# Patient Record
Sex: FEMALE | Race: WHITE | Employment: FULL TIME | RURAL
[De-identification: names, ages, dates, MRNs, and addresses within clinical notes are randomized per-mention and may not be internally consistent; named-entity substitution may affect disease eponyms.]

---

## 2021-12-07 ENCOUNTER — HOSPITAL ENCOUNTER (OUTPATIENT)
Dept: MAMMOGRAPHY | Age: 56
Discharge: HOME | End: 2021-12-07
Attending: OBSTETRICS & GYNECOLOGY
Payer: COMMERCIAL

## 2021-12-07 VITALS — BODY MASS INDEX: 31.09 KG/M2 | WEIGHT: 170 LBS

## 2021-12-07 DIAGNOSIS — Z12.31 VISIT FOR SCREENING MAMMOGRAM: ICD-10-CM

## 2021-12-07 PROCEDURE — 77063 BREAST TOMOSYNTHESIS BI: CPT

## 2022-03-17 ENCOUNTER — TRANSCRIBE ORDER (OUTPATIENT)
Dept: SCHEDULING | Age: 57
End: 2022-03-17

## 2022-03-17 DIAGNOSIS — R22.32 SUBCUTANEOUS MASS OF LEFT THUMB: Primary | ICD-10-CM

## 2022-03-24 ENCOUNTER — HOSPITAL ENCOUNTER (OUTPATIENT)
Dept: MRI IMAGING | Age: 57
Discharge: HOME | End: 2022-03-24
Attending: ORTHOPAEDIC SURGERY
Payer: COMMERCIAL

## 2022-03-24 DIAGNOSIS — R22.32 SUBCUTANEOUS MASS OF LEFT THUMB: ICD-10-CM

## 2022-03-24 PROCEDURE — 73218 MRI UPPER EXTREMITY W/O DYE: CPT

## 2022-04-08 RX ORDER — CHOLECALCIFEROL (VITAMIN D3) 125 MCG
1 CAPSULE ORAL
COMMUNITY

## 2022-04-08 NOTE — PERIOP NOTES
[facility]  Ambulatory Surgery Unit  Pre-operative Instructions    Surgery/Procedure Date  4/21            Tentative Arrival Time TBD      1. On the day of your surgery/procedure, please report to the Ambulatory Surgery Unit Registration Desk and sign in at your designated time. The Ambulatory Surgery Unit is located in [facility] on the [facility] side of the [facility] across from the [address] [place] [address]. Please have all of your health insurance cards and a photo ID.    **TWO adults may accompany you the day of the procedure. We have limited seating available. If our waiting room is at capacity, your ride may be asked to remain in their vehicle. No one under 15 is allowed in the waiting room. Masks, fully covering the mouth and nose, are required in the waiting room. 2. You must have someone with you to drive you home, as you should not drive a car for 24 hours following anesthesia. Please make arrangements for a responsible adult friend or family member to stay with you for at least the first 24 hours after your surgery. 3. Do not have anything to eat or drink (including water, gum, mints, coffee, juice) after 11:59 PM  4/20. This may not apply to medications prescribed by your physician. (Please note below the special instructions with medications to take the morning of surgery, if applicable.)    4. We recommend you do not drink any alcoholic beverages for 24 hours before and after your surgery. 5. Contact your surgeons office for instructions on the following medications: non-steroidal anti-inflammatory drugs (i.e. Advil, Aleve), vitamins, and supplements. (Some surgeons will want you to stop these medications prior to surgery and others may allow you to take them)   **If you are currently taking Plavix, Coumadin, Aspirin and/or other blood-thinning agents, contact your surgeon for instructions. ** Your surgeon will partner with the physician prescribing these medications to determine if it is safe to stop or if you need to continue taking. Please do not stop taking these medications without instructions from your surgeon. 6. In an effort to help prevent surgical site infection, we ask that you shower with an anti-bacterial soap (i.e. Dial/Safeguard, or the soap provided to you at your preadmission testing appointment) for 3 days prior to and on the morning of surgery, using a fresh towel after each shower. (Please begin this process with fresh bed linens.) Do not apply any lotions, powders, or deodorants after the shower on the day of your procedure. If applicable, please do not shave the operative site for 48 hours prior to surgery. 7. Wear comfortable clothes. Wear glasses instead of contacts. Do not bring any jewelry or money (other than copays or fees as instructed). Do not wear make-up, particularly mascara, the morning of your surgery. Do not wear nail polish, particularly if you are having foot /hand surgery. Wear your hair loose or down, no ponytails, buns, whit pins or clips. All body piercings must be removed. 8. You should understand that if you do not follow these instructions your surgery may be cancelled. If your physical condition changes (i.e. fever, cold or flu) please contact your surgeon as soon as possible. 9. It is important that you be on time. If a situation occurs where you may be late, or if you have any questions or problems, please call [phone].    10. Your surgery time may be subject to change. You will receive a phone call the day prior to surgery to confirm your arrival time. Special Instructions: Take all medications and inhalers, as prescribed, on the morning of surgery with a sip of water. I understand a pre-operative phone call will be made to verify my surgery time. In the event that I am not available, I give permission for a message to be left on my answering service and/or with another person? yes    Reviewed by phone with pt, verbalized understanding.      ___________________      ___________________      ________________  (Signature of Patient)          (Witness)                   (Date and Time)

## 2022-04-20 ENCOUNTER — ANESTHESIA EVENT (OUTPATIENT)
Dept: SURGERY | Age: 57
End: 2022-04-20
Payer: COMMERCIAL

## 2022-04-21 ENCOUNTER — ANESTHESIA (OUTPATIENT)
Dept: SURGERY | Age: 57
End: 2022-04-21
Payer: COMMERCIAL

## 2022-04-21 ENCOUNTER — HOSPITAL ENCOUNTER (OUTPATIENT)
Age: 57
Setting detail: OUTPATIENT SURGERY
Discharge: HOME | End: 2022-04-21
Attending: ORTHOPAEDIC SURGERY | Admitting: ORTHOPAEDIC SURGERY
Payer: COMMERCIAL

## 2022-04-21 VITALS
BODY MASS INDEX: 31.83 KG/M2 | DIASTOLIC BLOOD PRESSURE: 93 MMHG | WEIGHT: 173 LBS | HEIGHT: 62 IN | TEMPERATURE: 97.7 F | OXYGEN SATURATION: 100 % | SYSTOLIC BLOOD PRESSURE: 137 MMHG | RESPIRATION RATE: 15 BRPM | HEART RATE: 75 BPM

## 2022-04-21 DIAGNOSIS — G89.18 POSTOPERATIVE PAIN OF EXTREMITY: Primary | ICD-10-CM

## 2022-04-21 DIAGNOSIS — M79.609 POSTOPERATIVE PAIN OF EXTREMITY: Primary | ICD-10-CM

## 2022-04-21 PROCEDURE — 76030000001 HC AMB SURG OR TIME 1 TO 1.5: Performed by: ORTHOPAEDIC SURGERY

## 2022-04-21 PROCEDURE — 74011250636 HC RX REV CODE- 250/636: Performed by: ANESTHESIOLOGY

## 2022-04-21 PROCEDURE — A4565 SLINGS: HCPCS | Performed by: ORTHOPAEDIC SURGERY

## 2022-04-21 PROCEDURE — 76210000040 HC AMBSU PH I REC FIRST 0.5 HR: Performed by: ORTHOPAEDIC SURGERY

## 2022-04-21 PROCEDURE — 77030040356 HC CORD BPLR FRCP COVD -A: Performed by: ORTHOPAEDIC SURGERY

## 2022-04-21 PROCEDURE — 77030003601 HC NDL NRV BLK BBMI -A: Performed by: ANESTHESIOLOGY

## 2022-04-21 PROCEDURE — 77030002916 HC SUT ETHLN J&J -A: Performed by: ORTHOPAEDIC SURGERY

## 2022-04-21 PROCEDURE — 88304 TISSUE EXAM BY PATHOLOGIST: CPT

## 2022-04-21 PROCEDURE — 74011000250 HC RX REV CODE- 250: Performed by: ORTHOPAEDIC SURGERY

## 2022-04-21 PROCEDURE — 77030021352 HC CBL LD SYS DISP COVD -B: Performed by: ORTHOPAEDIC SURGERY

## 2022-04-21 PROCEDURE — 76210000046 HC AMBSU PH II REC FIRST 0.5 HR: Performed by: ORTHOPAEDIC SURGERY

## 2022-04-21 PROCEDURE — 77030000032 HC CUF TRNQT ZIMM -B: Performed by: ORTHOPAEDIC SURGERY

## 2022-04-21 PROCEDURE — 74011000250 HC RX REV CODE- 250: Performed by: NURSE ANESTHETIST, CERTIFIED REGISTERED

## 2022-04-21 PROCEDURE — 74011250636 HC RX REV CODE- 250/636: Performed by: NURSE ANESTHETIST, CERTIFIED REGISTERED

## 2022-04-21 PROCEDURE — 2709999900 HC NON-CHARGEABLE SUPPLY: Performed by: ORTHOPAEDIC SURGERY

## 2022-04-21 PROCEDURE — 74011250636 HC RX REV CODE- 250/636: Performed by: ORTHOPAEDIC SURGERY

## 2022-04-21 PROCEDURE — 76060000062 HC AMB SURG ANES 1 TO 1.5 HR: Performed by: ORTHOPAEDIC SURGERY

## 2022-04-21 RX ORDER — SODIUM CHLORIDE, SODIUM LACTATE, POTASSIUM CHLORIDE, CALCIUM CHLORIDE 600; 310; 30; 20 MG/100ML; MG/100ML; MG/100ML; MG/100ML
25 INJECTION, SOLUTION INTRAVENOUS CONTINUOUS
Status: DISCONTINUED | OUTPATIENT
Start: 2022-04-21 | End: 2022-04-21 | Stop reason: HOSPADM

## 2022-04-21 RX ORDER — MIDAZOLAM HYDROCHLORIDE 1 MG/ML
INJECTION, SOLUTION INTRAMUSCULAR; INTRAVENOUS AS NEEDED
Status: DISCONTINUED | OUTPATIENT
Start: 2022-04-21 | End: 2022-04-21 | Stop reason: HOSPADM

## 2022-04-21 RX ORDER — MORPHINE SULFATE 10 MG/ML
2 INJECTION, SOLUTION INTRAMUSCULAR; INTRAVENOUS
Status: DISCONTINUED | OUTPATIENT
Start: 2022-04-21 | End: 2022-04-21 | Stop reason: HOSPADM

## 2022-04-21 RX ORDER — DIPHENHYDRAMINE HYDROCHLORIDE 50 MG/ML
12.5 INJECTION, SOLUTION INTRAMUSCULAR; INTRAVENOUS AS NEEDED
Status: DISCONTINUED | OUTPATIENT
Start: 2022-04-21 | End: 2022-04-21 | Stop reason: HOSPADM

## 2022-04-21 RX ORDER — FENTANYL CITRATE 50 UG/ML
25 INJECTION, SOLUTION INTRAMUSCULAR; INTRAVENOUS
Status: DISCONTINUED | OUTPATIENT
Start: 2022-04-21 | End: 2022-04-21 | Stop reason: HOSPADM

## 2022-04-21 RX ORDER — LIDOCAINE HYDROCHLORIDE 10 MG/ML
0.1 INJECTION, SOLUTION EPIDURAL; INFILTRATION; INTRACAUDAL; PERINEURAL AS NEEDED
Status: DISCONTINUED | OUTPATIENT
Start: 2022-04-21 | End: 2022-04-21 | Stop reason: HOSPADM

## 2022-04-21 RX ORDER — SODIUM CHLORIDE 0.9 % (FLUSH) 0.9 %
5-40 SYRINGE (ML) INJECTION EVERY 8 HOURS
Status: DISCONTINUED | OUTPATIENT
Start: 2022-04-21 | End: 2022-04-21 | Stop reason: HOSPADM

## 2022-04-21 RX ORDER — SODIUM CHLORIDE 0.9 % (FLUSH) 0.9 %
5-40 SYRINGE (ML) INJECTION AS NEEDED
Status: DISCONTINUED | OUTPATIENT
Start: 2022-04-21 | End: 2022-04-21 | Stop reason: HOSPADM

## 2022-04-21 RX ORDER — PROPOFOL 10 MG/ML
INJECTION, EMULSION INTRAVENOUS
Status: DISCONTINUED | OUTPATIENT
Start: 2022-04-21 | End: 2022-04-21 | Stop reason: HOSPADM

## 2022-04-21 RX ORDER — MIDAZOLAM HYDROCHLORIDE 1 MG/ML
INJECTION, SOLUTION INTRAMUSCULAR; INTRAVENOUS
Status: COMPLETED
Start: 2022-04-21 | End: 2022-04-21

## 2022-04-21 RX ORDER — HYDROMORPHONE HYDROCHLORIDE 1 MG/ML
.2-.5 INJECTION, SOLUTION INTRAMUSCULAR; INTRAVENOUS; SUBCUTANEOUS ONCE
Status: DISCONTINUED | OUTPATIENT
Start: 2022-04-21 | End: 2022-04-21 | Stop reason: HOSPADM

## 2022-04-21 RX ORDER — WATER FOR INJECTION,STERILE
VIAL (ML) INJECTION
Status: DISCONTINUED
Start: 2022-04-21 | End: 2022-04-21 | Stop reason: HOSPADM

## 2022-04-21 RX ORDER — LIDOCAINE HYDROCHLORIDE 20 MG/ML
INJECTION, SOLUTION EPIDURAL; INFILTRATION; INTRACAUDAL; PERINEURAL AS NEEDED
Status: DISCONTINUED | OUTPATIENT
Start: 2022-04-21 | End: 2022-04-21 | Stop reason: HOSPADM

## 2022-04-21 RX ORDER — ONDANSETRON 2 MG/ML
4 INJECTION INTRAMUSCULAR; INTRAVENOUS AS NEEDED
Status: DISCONTINUED | OUTPATIENT
Start: 2022-04-21 | End: 2022-04-21 | Stop reason: HOSPADM

## 2022-04-21 RX ORDER — HYDROCODONE BITARTRATE AND ACETAMINOPHEN 5; 325 MG/1; MG/1
1 TABLET ORAL
Qty: 10 TABLET | Refills: 0 | Status: SHIPPED | OUTPATIENT
Start: 2022-04-21 | End: 2022-04-24

## 2022-04-21 RX ORDER — PROPOFOL 10 MG/ML
INJECTION, EMULSION INTRAVENOUS AS NEEDED
Status: DISCONTINUED | OUTPATIENT
Start: 2022-04-21 | End: 2022-04-21 | Stop reason: HOSPADM

## 2022-04-21 RX ORDER — CEFAZOLIN SODIUM 1 G/3ML
INJECTION, POWDER, FOR SOLUTION INTRAMUSCULAR; INTRAVENOUS
Status: DISCONTINUED
Start: 2022-04-21 | End: 2022-04-21 | Stop reason: HOSPADM

## 2022-04-21 RX ORDER — OXYCODONE AND ACETAMINOPHEN 5; 325 MG/1; MG/1
1 TABLET ORAL
Status: DISCONTINUED | OUTPATIENT
Start: 2022-04-21 | End: 2022-04-21 | Stop reason: HOSPADM

## 2022-04-21 RX ADMIN — WATER 2 G: 1 INJECTION INTRAMUSCULAR; INTRAVENOUS; SUBCUTANEOUS at 08:53

## 2022-04-21 RX ADMIN — PROPOFOL INJECTABLE EMULSION 75 MCG/KG/MIN: 10 INJECTION, EMULSION INTRAVENOUS at 08:55

## 2022-04-21 RX ADMIN — SODIUM CHLORIDE, POTASSIUM CHLORIDE, SODIUM LACTATE AND CALCIUM CHLORIDE 25 ML/HR: 600; 310; 30; 20 INJECTION, SOLUTION INTRAVENOUS at 07:47

## 2022-04-21 RX ADMIN — PROPOFOL 80 MG: 10 INJECTION, EMULSION INTRAVENOUS at 08:51

## 2022-04-21 RX ADMIN — MEPIVACAINE HYDROCHLORIDE 30 ML: 15 INJECTION, SOLUTION EPIDURAL; INFILTRATION at 08:00

## 2022-04-21 RX ADMIN — MIDAZOLAM HYDROCHLORIDE 3 MG: 1 INJECTION, SOLUTION INTRAMUSCULAR; INTRAVENOUS at 07:56

## 2022-04-21 RX ADMIN — LIDOCAINE HYDROCHLORIDE 40 MG: 20 INJECTION, SOLUTION EPIDURAL; INFILTRATION; INTRACAUDAL; PERINEURAL at 08:51

## 2022-04-21 NOTE — ANESTHESIA PROCEDURE NOTES
Peripheral Block    Start time: 4/21/2022 7:53 AM  End time: 4/21/2022 8:05 AM  Performed by: Ming Avitia MD  Authorized by: Ming Avitia MD       Pre-procedure: Indications: at surgeon's request, post-op pain management and primary anesthetic    Preanesthetic Checklist: patient identified, risks and benefits discussed, site marked, timeout performed, anesthesia consent given and patient being monitored    Timeout Time: 07:55 EDT          Block Type:   Block Type:  Axillary  Laterality:  Left  Monitoring:  Standard ASA monitoring, responsive to questions and oxygen  Injection Technique:  Single shot  Procedures: ultrasound guided    Patient Position: supine  Prep: chlorhexidine    Location:  Axilla  Needle Type:  Stimuplex  Needle Gauge:  22 G  Needle Localization:  Ultrasound guidance  Medication Injected:  Mepivacaine PF (CARBOCAINE) 1.5 % injection, 30 mL  Med Admin Time: 4/21/2022 8:00 AM    Assessment:  Number of attempts:  1  Injection Assessment:  Incremental injection every 5 mL, no paresthesia, local visualized surrounding nerve on ultrasound, negative aspiration for blood and no intravascular symptoms  Patient tolerance:  Patient tolerated the procedure well with no immediate complications  Intercostobrachial  Block: left axilla, chlohex prep, 25 g needle & 5 ml 1.5% mepivacaine injected subcut. Tolerated well.

## 2022-04-21 NOTE — BRIEF OP NOTE
Brief Postoperative Note    Patient: Enoch Torrez  YOB: 1965  MRN: [id number]    Date of Procedure: 4/21/2022     Pre-Op Diagnosis: MASS THENAR EMINENCE LEFT (4X3 CM)    Post-Op Diagnosis: Same as preoperative diagnosis. Procedure(s):  EXCISION MASS LEFT THENAR EMINENCE (AX BLOCK)    Surgeon(s):  Carroll Baca MD    Surgical Assistant: None    Anesthesia: Other     Estimated Blood Loss (mL): Minimal    Complications: None    Specimens:   ID Type Source Tests Collected by Time Destination   1 : LEFT HAND MASS Preservative Hand  Carroll Baca MD 4/21/2022 1970 Pathology        Implants: * No implants in log *    Drains: * No LDAs found *    Findings: As above, probable lipoma.     Electronically Signed by Patrick Gar MD on 4/21/2022 at 10:13 AM

## 2022-04-21 NOTE — PERIOP NOTES
Kacyzahida Colmenares  1965  [id number]    Situation:  Verbal report given from: Zane Morales CRNA, Shyam Loja RN  Procedure: Procedure(s):  EXCISION MASS LEFT THENAR EMINENCE (AX BLOCK)    Background:    Preoperative diagnosis: MASS THENAR EMINENCE LEFT (4X3 CM)    Postoperative diagnosis: MASS THENAR EMINENCE LEFT (4X3 CM)    :  Dr. Ale Ferraro    Assistant(s): Circ-1: Jordyn Mccoy RN  Scrub Tech-1: Clark Quintanilla    Specimens:   ID Type Source Tests Collected by Time Destination   1 : LEFT HAND MASS Preservative Hand  Charmayne Martens, MD 4/21/2022 0906 Pathology       Assessment:  Intra-procedure medications         Anesthesia gave intra-procedure sedation and medications, see anesthesia flow sheet     Intravenous fluids: LR@ KVO     Vital signs stable       Recommendation:    Permission to share finding with  : yes

## 2022-04-21 NOTE — PERIOP NOTES
1007-Pt. To pacu, sleeping. Vss. Oral airway in place. Dressing to left hand intact. 1014-Oral airway removed. Pt. Denies pain. 1028-Discharge instructions reviewed w/pts. . He verbalized understanding. 1042-Pt. States ready for discharge. Vss. Denies pain. Dressing to left arm intact. Sling placed to left arm. Pt discharged via wheelchair to car, accompanied by RN. Pt discharged awake and alert, respirations equal and unlabored, skin warm, dry, and intact. Pt and family members' questions and concerns addressed prior to discharge.

## 2022-04-21 NOTE — PERIOP NOTES
Permission received to review discharge instructions and discuss private health information with Violet Ramírez, .

## 2022-04-21 NOTE — PERIOP NOTES
Dr. Poornima Smith performed nerve block in preop using ultrasound machine to LUE. Pt on CM x3, 02 by NC at 3L, patient responsive when spoken to. Able to answer questions appropriately. Pt did receive Versed 3 mg IV given by Dr. Poornima Smith for sedation. Pt tolerated procedure well. VSS and will continue to monitor.

## 2022-04-21 NOTE — ANESTHESIA PREPROCEDURE EVALUATION
Relevant Problems   No relevant active problems       Anesthetic History   No history of anesthetic complications            Review of Systems / Medical History  Patient summary reviewed, nursing notes reviewed and pertinent labs reviewed    Pulmonary  Within defined limits                 Neuro/Psych   Within defined limits           Cardiovascular                  Exercise tolerance: >4 METS  Comments: Elevated BP; observation only   GI/Hepatic/Renal  Within defined limits              Endo/Other        Obesity     Other Findings   Comments: Mass left thenar eminence         Physical Exam    Airway  Mallampati: I  TM Distance: 4 - 6 cm  Neck ROM: normal range of motion   Mouth opening: Normal     Cardiovascular    Rhythm: regular  Rate: normal         Dental    Dentition: Implants     Pulmonary  Breath sounds clear to auscultation               Abdominal  GI exam deferred       Other Findings            Anesthetic Plan    ASA: 1  Anesthesia type: MAC and regional - brachial plexus block      Post-op pain plan if not by surgeon: peripheral nerve block single      Anesthetic plan and risks discussed with: Patient

## 2022-04-21 NOTE — ANESTHESIA POSTPROCEDURE EVALUATION
Procedure(s):  EXCISION MASS LEFT THENAR EMINENCE (AX BLOCK). MAC, regional    Anesthesia Post Evaluation      Multimodal analgesia: multimodal analgesia used between 6 hours prior to anesthesia start to PACU discharge  Patient location during evaluation: PACU  Patient participation: complete - patient participated  Level of consciousness: awake and alert  Pain score: 0  Airway patency: patent  Anesthetic complications: no  Cardiovascular status: acceptable  Respiratory status: acceptable  Hydration status: acceptable  Comments: Pt has axillary block. Sling postop until block resolves.   Post anesthesia nausea and vomiting:  none  Final Post Anesthesia Temperature Assessment:  Normothermia (36.0-37.5 degrees C)      INITIAL Post-op Vital signs:   Vitals Value Taken Time   /88 04/21/22 1020   Temp 36.5 °C (97.7 °F) 04/21/22 1020   Pulse 78 04/21/22 1020   Resp 14 04/21/22 1020   SpO2 100 % 04/21/22 1020

## 2022-04-21 NOTE — OP NOTES
[address]  OPERATIVE REPORT    Name:  Silviano Bragg  MR#:  [id number]  :  1965  ACCOUNT #:  [de-identified]  DATE OF SERVICE:  2022    PREOPERATIVE DIAGNOSIS:  4 x 3 cm mass, left thenar eminence. POSTOPERATIVE DIAGNOSIS:  4 x 3 cm mass, left thenar eminence. PROCEDURE PERFORMED:  Excision of 4 x 3 cm mass, left thenar eminence. SURGEON:  Jennifer Vitale MD    ASSISTANT:  None. ANESTHESIA:  Regional plus sedation. COMPLICATIONS:  None. SPECIMENS REMOVED:  Left hand mass. IMPLANTS:  None. ESTIMATED BLOOD LOSS:  Minimal.    DRAINS:  None. SUMMARY:  The patient was brought into the operating room, placed on the operating room table in a supine position and sedation administered. Note that the operative site had been identified, regional anesthetic administered and appropriate preoperative antibiotic prophylaxis administered in preop holding. The left upper extremity was prepped and draped in the usual manner. After time-out the limb was elevated, exsanguinated with Esmarch bandage and the tourniquet inflated to 250 mmHg. A curved incision was made overlying the left palm thenar eminence. Subcutaneous tissues were carefully divided. After dissecting around a bit, an opening was found in the muscle where I could get to the mass. The muscle was therefore split and the mass exposed and carefully dissected around freed up and removed. The mass was near the radial neurovascular bundle, which was found to be intact after the mass was removed. Note that in order to remove the mass, a T-shaped incision had to be made in order to expose the proximal portion of the mass, and make sure that I was not damaging any neurovascular structures while removing the mass. After thorough irrigation, the wound was closed with a combination of 4-0 nylon and 4-0 Vicryl Rapide sutures. Xeroform, 4x4s, Kerlix and Coban were used as dressing. Tourniquet was released.   Total tourniquet time was 58 minutes. The patient tolerated the procedure well, was taken back to the PACU in stable condition.       Gregory Davila MD      [id number]  D:  04/21/2022 10:20  T:  04/21/2022 14:36  JOB #:  [id number]

## 2022-04-21 NOTE — DISCHARGE INSTRUCTIONS
Discharge Instructions for:    Miguelito Chambers / [id number] : 1965    Admitted 2022 Discharged: 2022       Postoperative Hand Surgery Instructions      Elevation:  Elevation is one of the most important things to do following your surgery. Not only does it decrease swelling, but it also decreases pain. For hand or wrist surgery, keep the arm in your sling while up and about, with your hand above your elbow. When lying down, keep your arm propped up on a few pillows, so that your fingers are pointing towards the ceiling. Finger Motion:  **It is very important that you begin moving your fingers immediately after surgery, as often as you can, in order to prevent stiffness. Wiggling your fingers is not the same as moving them - moving your fingers involves bending them until they touch the dressing (if possible). You should use your other hand to gently move the fingers on the operative hand if necessary. Dressings:  Please leave the surgical dressing in place until you are seen in the office for your first post-operative appointment with Dr Cira Doulgas. The dressing helps to prevent infection and positions the extremity to protect the surgical procedure  that was performed. Bathing and showering are allowed as long as the dressing is kept dry. To keep your dressing dry while bathing, simply place a plastic bag (bread bag, newspaper bag, trash bag or subway sandwich bag) over the dressing and seal it with tape or a rubber band. Medications: You have likely been given a prescription for pain medication. Please follow the instructions closely. It is safe to take up to 600mg of Ibuprofen (Advil or Motrin) along with the pain prescription as long you are not allergic to it, are not on blood thinners, and do not have gastric reflux or an ulcer. However, do not take any additional tylenol/acetaminophen if your prescription contains tylenol.       Note that my policy is to give only one prescription for pain medication at the time of the surgery - if you use it up quickly, then you will have no more pain medication left after only a few days, and I will not give you another prescription. So use your pain medication sparingly, and only when necessary! If you have new or increasing numbness after any numbing medicine wears off (12-24 hours for regional blocks, 3 hours for local), call the office immediately. If you experience nausea, vomiting or itching with the pain medication, please call the office during regular office hours. Refills for pain medication prescriptions ARE NOT given on the weekend or after regular office hours. If antibiotics have been prescribed, please be sure to complete the entire prescription. Post-Operative Appointments:  Dr. Ace Doss likes to see you back in the office about 10-14 days following your surgery to change the post-operative dressing and remove any necessary sutures or staples. If you have not received a follow up appointment card please contact our office at [phone]. *If you have any questions or concerns or experience any sudden changes in your condition, please call our office at [phone]*      DO NOT TAKE TYLENOL/ACETAMINOPHEN WITH PERCOCET, [address], [address] TAKE NARCOTIC PAIN MEDICATIONS WITH FOOD! For the night of surgery, while block is still in effect, start with 1 pain pill at bedtime    Narcotics tend to be constipating and we recommend taking a stool softener such as Colace or Miralax (follow package instructions). If you were given prescriptions, please review the written information on the prescribed medications. DO NOT DRIVE WHILE TAKING NARCOTIC PAIN MEDICATIONS. CPAP PATIENTS BE SURE TO WEAR MACHINE WHENEVER NAPPING OR SLEEPING DAY/NIGHT OF SURGERY!     DISCHARGE SUMMARY from Nurse    The following personal items collected during your admission are returned to you:   Dental Appliance: Dental Appliances: None  Vision: Visual Aid: Glasses (PACU)  Hearing Aid:    Jewelry: Jewelry: None  Clothing: Clothing: With patient  Other Valuables: Other Valuables: Eyeglasses (PACU)  Valuables sent to safe:        PATIENT INSTRUCTIONS:    After General Anesthesia or Intravenous Sedation, for 24 hours or while taking prescription Narcotics:  · Someone should be with you for the next 24 hours. · For your own safety, a responsible adult must drive you home. · Limit your activities  · Recommended activity: Rest today, up with assistance today. Do not climb stairs or shower unattended for the next 24 hours. · Start with a soft bland diet and advance as tolerated (no nausea) to regular diet. · If you have a sore throat some things that may help are: fluids, warm salt water gargle, or throat lozenges. If this does not improve after several days please follow up with your family physician. · Do not drive and operate hazardous machinery  · Do not make important personal or business decisions  · Do  not drink alcoholic beverages  · If you have not urinated within 8 hours after discharge, please contact your surgeon on call. Report the following to your surgeon:  · Excessive pain, swelling, redness or odor of or around the surgical area  · Temperature over 100.5  · Nausea and vomiting lasting longer than 4 hours or if unable to take medications  · Any signs of decreased circulation or nerve impairment to extremity: change in color, persistent  numbness, tingling, coldness or increase pain    · If you received an upper extremity nerve block, please wear your sling until the block has worn off, then refer to your surgeons post-operative instructions. If you have had a shoulder block or a block near your collar bone, you may have              symptoms such as:          1. Mild shortness of breath        2. A hoarse voice        3. Blurry vision        4. Unequal pupils        5.  Drooping of your face on the same side as the nerve block. These symptoms will disappear as the nerve block wears off. · You will receive a Post Operative Call from one of the Recovery Room Nurses on the day after your surgery to check on you. It is very important for us to know how you are recovering after your surgery. If you have an issue please call your surgeon, do not wait for the post operative call. · You may receive an e-mail or letter in the mail from [employer] regarding your experience with us in the Ambulatory Surgery Unit. Your feedback is valuable to us and we appreciate your participation in the survey. ·   · If the above instructions are not adequate or you are having problems after your surgery, call your physician at their office number. ·   · We wish youre a speedy recovery ? What to do at Home:    *  Please give a list of your current medications to your Primary Care Provider. *  Please update this list whenever your medications are discontinued, doses are      changed, or new medications (including over-the-counter products) are added. *  Please carry medication information at all times in case of emergency situations. If you have not had your influenza or pneumococcal vaccines, please follow up with your primary care physician. The discharge information has been reviewed with the patient and caregiver. The patient and caregiver verbalized understanding. TO PREVENT AN INFECTION      1. [address] YOUR HANDS     To prevent infection, good handwashing is the most important thing you or your caregiver can do.  Wash your hands with soap and water or use the hand  we gave you before you touch any wounds. 2. SHOWER     Use the antibacterial soap we gave you when you take a shower.  Shower with this soap until your wounds are healed.  To reach all areas of your body, you may need someone to help you.       Dont forget to clean your belly button with every shower. 3.  USE CLEAN SHEETS     Use freshly cleaned sheets on your bed after surgery.  To keep the surgery site clean, do not allow pets to sleep with you while your wound is still healing.

## 2022-12-30 ENCOUNTER — HOSPITAL ENCOUNTER (OUTPATIENT)
Dept: MAMMOGRAPHY | Age: 57
Discharge: HOME | End: 2022-12-30
Attending: OBSTETRICS & GYNECOLOGY
Payer: COMMERCIAL

## 2022-12-30 VITALS — WEIGHT: 173 LBS | BODY MASS INDEX: 31.64 KG/M2

## 2022-12-30 DIAGNOSIS — Z12.31 VISIT FOR SCREENING MAMMOGRAM: ICD-10-CM

## 2022-12-30 PROCEDURE — 77063 BREAST TOMOSYNTHESIS BI: CPT

## 2024-02-01 ENCOUNTER — HOSPITAL ENCOUNTER (OUTPATIENT)
Facility: HOSPITAL | Age: 59
Discharge: HOME | End: 2024-02-01
Payer: COMMERCIAL

## 2024-02-01 ENCOUNTER — HOSPITAL ENCOUNTER (OUTPATIENT)
Facility: HOSPITAL | Age: 59
End: 2024-02-01
Payer: COMMERCIAL

## 2024-02-01 VITALS — BODY MASS INDEX: 33.68 KG/M2 | WEIGHT: 183 LBS | HEIGHT: 62 IN

## 2024-02-01 DIAGNOSIS — N64.4 PAIN OF BOTH BREASTS: ICD-10-CM

## 2024-02-01 DIAGNOSIS — N63.11 LUMP IN UPPER OUTER QUADRANT OF RIGHT BREAST: ICD-10-CM

## 2024-02-01 PROCEDURE — G0279 TOMOSYNTHESIS, MAMMO: HCPCS

## 2024-02-01 PROCEDURE — 76642 ULTRASOUND BREAST LIMITED: CPT

## 2025-01-23 ENCOUNTER — TRANSCRIBE ORDERS (OUTPATIENT)
Facility: HOSPITAL | Age: 60
End: 2025-01-23

## 2025-01-23 DIAGNOSIS — Z12.31 VISIT FOR SCREENING MAMMOGRAM: Primary | ICD-10-CM

## 2025-02-11 ENCOUNTER — HOSPITAL ENCOUNTER (OUTPATIENT)
Facility: HOSPITAL | Age: 60
Discharge: HOME | End: 2025-02-14
Payer: COMMERCIAL

## 2025-02-11 DIAGNOSIS — Z12.31 VISIT FOR SCREENING MAMMOGRAM: ICD-10-CM

## 2025-02-11 PROCEDURE — 77063 BREAST TOMOSYNTHESIS BI: CPT

## 2025-08-27 ENCOUNTER — HOSPITAL ENCOUNTER (OUTPATIENT)
Facility: HOSPITAL | Age: 60
Discharge: HOME | End: 2025-08-30
Payer: COMMERCIAL

## 2025-08-27 VITALS
HEART RATE: 73 BPM | TEMPERATURE: 98.3 F | RESPIRATION RATE: 16 BRPM | DIASTOLIC BLOOD PRESSURE: 80 MMHG | SYSTOLIC BLOOD PRESSURE: 138 MMHG | OXYGEN SATURATION: 99 %

## 2025-08-27 LAB
ABO + RH BLD: NORMAL
APPEARANCE UR: CLEAR
BACTERIA URNS QL MICRO: NEGATIVE /HPF
BILIRUB UR QL: NEGATIVE
BLOOD GROUP ANTIBODIES SERPL: NORMAL
COLOR UR: ABNORMAL
EPITH CASTS URNS QL MICRO: ABNORMAL /LPF
ERYTHROCYTE [DISTWIDTH] IN BLOOD BY AUTOMATED COUNT: 14.1 % (ref 11.5–14.5)
GLUCOSE UR STRIP.AUTO-MCNC: NEGATIVE MG/DL
HCT VFR BLD AUTO: 38.4 % (ref 35–47)
HGB BLD-MCNC: 12.2 G/DL (ref 11.5–16)
HGB UR QL STRIP: NEGATIVE
HYALINE CASTS URNS QL MICRO: ABNORMAL /LPF (ref 0–2)
KETONES UR QL STRIP.AUTO: NEGATIVE MG/DL
LEUKOCYTE ESTERASE UR QL STRIP.AUTO: ABNORMAL
MCH RBC QN AUTO: 28.2 PG (ref 26–34)
MCHC RBC AUTO-ENTMCNC: 31.8 G/DL (ref 30–36.5)
MCV RBC AUTO: 88.9 FL (ref 80–99)
NITRITE UR QL STRIP.AUTO: NEGATIVE
NRBC # BLD: 0 K/UL (ref 0–0.01)
NRBC BLD-RTO: 0 PER 100 WBC
PH UR STRIP: 5.5 (ref 5–8)
PLATELET # BLD AUTO: 202 K/UL (ref 150–400)
PMV BLD AUTO: 11.7 FL (ref 8.9–12.9)
PROT UR STRIP-MCNC: NEGATIVE MG/DL
RBC # BLD AUTO: 4.32 M/UL (ref 3.8–5.2)
RBC #/AREA URNS HPF: ABNORMAL /HPF (ref 0–5)
SP GR UR REFRACTOMETRY: 1.01
SPECIMEN EXP DATE BLD: NORMAL
URINE CULTURE IF INDICATED: ABNORMAL
UROBILINOGEN UR QL STRIP.AUTO: 0.2 EU/DL (ref 0.2–1)
WBC # BLD AUTO: 4.3 K/UL (ref 3.6–11)
WBC URNS QL MICRO: ABNORMAL /HPF (ref 0–4)

## 2025-08-27 PROCEDURE — 86901 BLOOD TYPING SEROLOGIC RH(D): CPT

## 2025-08-27 PROCEDURE — 85027 COMPLETE CBC AUTOMATED: CPT

## 2025-08-27 PROCEDURE — 36415 COLL VENOUS BLD VENIPUNCTURE: CPT

## 2025-08-27 PROCEDURE — 86900 BLOOD TYPING SEROLOGIC ABO: CPT

## 2025-08-27 PROCEDURE — 86850 RBC ANTIBODY SCREEN: CPT

## 2025-08-27 PROCEDURE — 81001 URINALYSIS AUTO W/SCOPE: CPT

## (undated) DEVICE — SLING ARM LIFETEC ORTH UNIV --

## (undated) DEVICE — BANDAGE,GAUZE,BULKEE II,4.5"X4.1YD,STRL: Brand: MEDLINE

## (undated) DEVICE — 3M™ TEGADERM™ TRANSPARENT FILM DRESSING FRAME STYLE, 1624W, 2-3/8 IN X 2-3/4 IN (6 CM X 7 CM), 100/CT 4CT/CASE: Brand: 3M™ TEGADERM™

## (undated) DEVICE — CONTINU-FLO SOLUTION SET, 2 INJECTION SITES, MALE LUER LOCK ADAPTER WITH RETRACTABLE COLLAR, LARGE BORE STOPCOCK WITH ROTATING MALE LUER LOCK EXTENSION SET, 2 INJECTION SITES, MALE LUER LOCK ADAPTER WITH RETRACTABLE COLLAR: Brand: INTERLINK/CONTINU-FLO

## (undated) DEVICE — KENDALL DL ECG CABLE AND LEAD WIRE SYSTEM, 3-LEAD, SINGLE PATIENT USE: Brand: KENDALL

## (undated) DEVICE — GLOVE ORANGE PI 8   MSG9080

## (undated) DEVICE — HAND-MRMCASU: Brand: MEDLINE INDUSTRIES, INC.

## (undated) DEVICE — SYRINGE,EAR/ULCER, 2 OZ, STERILE: Brand: MEDLINE

## (undated) DEVICE — SUTURE ETHLN SZ 4-0 L18IN NONABSORBABLE BLK L19MM PS-2 3/8 1667H

## (undated) DEVICE — WRAP COHESIVE W2INXL5YD TAN SELF ADH BNDG HND NON STERILE TEAR CARING

## (undated) DEVICE — SOLUTION IRRIG 1000ML 0.9% SOD CHL USP POUR PLAS BTL

## (undated) DEVICE — BIPOLAR FORCEPS CORD: Brand: VALLEYLAB

## (undated) DEVICE — BANDAGE,GAUZE,CONFORMING,2"X75",STRL,LF: Brand: MEDLINE INDUSTRIES, INC.

## (undated) DEVICE — ZIMMER® STERILE DISPOSABLE TOURNIQUET CUFF WITH PLC, DUAL PORT, SINGLE BLADDER, 18 IN. (46 CM)